# Patient Record
Sex: FEMALE | Race: BLACK OR AFRICAN AMERICAN | NOT HISPANIC OR LATINO
[De-identification: names, ages, dates, MRNs, and addresses within clinical notes are randomized per-mention and may not be internally consistent; named-entity substitution may affect disease eponyms.]

---

## 2017-05-16 ENCOUNTER — APPOINTMENT (OUTPATIENT)
Dept: CARDIOLOGY | Facility: CLINIC | Age: 37
End: 2017-05-16

## 2017-05-16 ENCOUNTER — NON-APPOINTMENT (OUTPATIENT)
Age: 37
End: 2017-05-16

## 2017-05-16 VITALS
OXYGEN SATURATION: 97 % | SYSTOLIC BLOOD PRESSURE: 120 MMHG | HEART RATE: 76 BPM | BODY MASS INDEX: 30.21 KG/M2 | WEIGHT: 160 LBS | HEIGHT: 61 IN | DIASTOLIC BLOOD PRESSURE: 81 MMHG

## 2018-01-30 ENCOUNTER — APPOINTMENT (OUTPATIENT)
Dept: CARDIOLOGY | Facility: CLINIC | Age: 38
End: 2018-01-30

## 2018-02-06 ENCOUNTER — APPOINTMENT (OUTPATIENT)
Dept: CARDIOLOGY | Facility: CLINIC | Age: 38
End: 2018-02-06
Payer: COMMERCIAL

## 2018-02-06 ENCOUNTER — NON-APPOINTMENT (OUTPATIENT)
Age: 38
End: 2018-02-06

## 2018-02-06 VITALS
SYSTOLIC BLOOD PRESSURE: 103 MMHG | HEIGHT: 61 IN | WEIGHT: 160 LBS | DIASTOLIC BLOOD PRESSURE: 73 MMHG | BODY MASS INDEX: 30.21 KG/M2 | OXYGEN SATURATION: 100 % | HEART RATE: 88 BPM

## 2018-02-06 PROCEDURE — 93000 ELECTROCARDIOGRAM COMPLETE: CPT

## 2018-02-06 PROCEDURE — 99215 OFFICE O/P EST HI 40 MIN: CPT

## 2018-04-04 ENCOUNTER — APPOINTMENT (OUTPATIENT)
Dept: PLASTIC SURGERY | Facility: CLINIC | Age: 38
End: 2018-04-04

## 2018-04-04 VITALS
TEMPERATURE: 99.1 F | WEIGHT: 180 LBS | BODY MASS INDEX: 34.01 KG/M2 | SYSTOLIC BLOOD PRESSURE: 116 MMHG | DIASTOLIC BLOOD PRESSURE: 81 MMHG | RESPIRATION RATE: 15 BRPM | HEART RATE: 89 BPM

## 2018-05-21 ENCOUNTER — RESULT REVIEW (OUTPATIENT)
Age: 38
End: 2018-05-21

## 2019-01-15 ENCOUNTER — APPOINTMENT (OUTPATIENT)
Dept: CARDIOLOGY | Facility: CLINIC | Age: 39
End: 2019-01-15
Payer: COMMERCIAL

## 2019-01-15 ENCOUNTER — NON-APPOINTMENT (OUTPATIENT)
Age: 39
End: 2019-01-15

## 2019-01-15 VITALS
DIASTOLIC BLOOD PRESSURE: 66 MMHG | OXYGEN SATURATION: 98 % | HEART RATE: 95 BPM | SYSTOLIC BLOOD PRESSURE: 109 MMHG | HEIGHT: 61 IN

## 2019-01-15 DIAGNOSIS — Z87.898 PERSONAL HISTORY OF OTHER SPECIFIED CONDITIONS: ICD-10-CM

## 2019-01-15 PROCEDURE — 99214 OFFICE O/P EST MOD 30 MIN: CPT

## 2019-01-15 PROCEDURE — 93000 ELECTROCARDIOGRAM COMPLETE: CPT

## 2019-01-15 NOTE — HISTORY OF PRESENT ILLNESS
[FreeTextEntry1] : Ms. Rosario is a 38 year-old woman with known recent CVA who was found to have a PFO during her workup. She has recovered from her CVA fully however given that is was while she was on aspirin therapy given her sickle cell trait she was referred to discuss closure. She has successfully underwent closure of the PFO without incident. She has been feeling no chest pain or dyspnea.

## 2019-01-15 NOTE — DISCUSSION/SUMMARY
[FreeTextEntry1] : Ms. Rosario is a 38 year-old woman with known CVA with a PFO.\par \par Plan:\par 1. Given the hx CVA with recovery and the only finding of a PFO on therapy, it was successfully closed.\par 2. C/W aspirin as of now indefinitely. Check TTE.\par 3. Old records requested and reviewed with performing physician.\par 4. Primary and secondary prevention of cardiovascular and related conditions discussed at length, including but not limited to diet and lifestyle modification.\par 5. Patient to return to the office in 1-3 months.\par \par Thank you for allowing me to participate in the care of your patient. If you have any questions, please feel free to contact me at [phone] or via email at [email or web address].\par \par Sincerely,\par \par Chris Matute MD [facility]\par Email: [email or web address]

## 2019-06-11 PROBLEM — Z00.00 ENCOUNTER FOR PREVENTIVE HEALTH EXAMINATION: Status: ACTIVE | Noted: 2019-06-11

## 2019-07-23 ENCOUNTER — APPOINTMENT (OUTPATIENT)
Dept: PLASTIC SURGERY | Facility: CLINIC | Age: 39
End: 2019-07-23

## 2019-08-23 ENCOUNTER — APPOINTMENT (OUTPATIENT)
Dept: CV DIAGNOSITCS | Facility: HOSPITAL | Age: 39
End: 2019-08-23

## 2019-08-23 ENCOUNTER — OUTPATIENT (OUTPATIENT)
Dept: OUTPATIENT SERVICES | Facility: HOSPITAL | Age: 39
LOS: 1 days | End: 2019-08-23
Payer: COMMERCIAL

## 2019-08-23 DIAGNOSIS — Q21.1 ATRIAL SEPTAL DEFECT: ICD-10-CM

## 2019-08-23 DIAGNOSIS — Z98.89 OTHER SPECIFIED POSTPROCEDURAL STATES: Chronic | ICD-10-CM

## 2019-08-23 PROCEDURE — 93306 TTE W/DOPPLER COMPLETE: CPT

## 2019-08-23 PROCEDURE — 93306 TTE W/DOPPLER COMPLETE: CPT | Mod: 26

## 2019-09-17 ENCOUNTER — APPOINTMENT (OUTPATIENT)
Dept: PLASTIC SURGERY | Facility: CLINIC | Age: 39
End: 2019-09-17

## 2019-11-25 ENCOUNTER — RESULT REVIEW (OUTPATIENT)
Age: 39
End: 2019-11-25

## 2019-12-17 ENCOUNTER — APPOINTMENT (OUTPATIENT)
Dept: PLASTIC SURGERY | Facility: CLINIC | Age: 39
End: 2019-12-17

## 2020-03-10 ENCOUNTER — APPOINTMENT (OUTPATIENT)
Dept: PLASTIC SURGERY | Facility: CLINIC | Age: 40
End: 2020-03-10

## 2020-06-02 ENCOUNTER — APPOINTMENT (OUTPATIENT)
Dept: CARDIOLOGY | Facility: CLINIC | Age: 40
End: 2020-06-02
Payer: COMMERCIAL

## 2020-06-02 VITALS
BODY MASS INDEX: 29.27 KG/M2 | DIASTOLIC BLOOD PRESSURE: 85 MMHG | OXYGEN SATURATION: 99 % | HEIGHT: 61 IN | SYSTOLIC BLOOD PRESSURE: 120 MMHG | WEIGHT: 155 LBS | HEART RATE: 98 BPM

## 2020-06-02 DIAGNOSIS — Z00.00 ENCOUNTER FOR GENERAL ADULT MEDICAL EXAMINATION W/OUT ABNORMAL FINDINGS: ICD-10-CM

## 2020-06-02 PROCEDURE — 93000 ELECTROCARDIOGRAM COMPLETE: CPT

## 2020-06-02 PROCEDURE — 99214 OFFICE O/P EST MOD 30 MIN: CPT

## 2020-06-02 NOTE — PHYSICAL EXAM
[General Appearance - Well Developed] : well developed [Normal Appearance] : normal appearance [Well Groomed] : well groomed [General Appearance - Well Nourished] : well nourished [No Deformities] : no deformities [General Appearance - In No Acute Distress] : no acute distress [Normal Conjunctiva] : the conjunctiva exhibited no abnormalities [Eyelids - No Xanthelasma] : the eyelids demonstrated no xanthelasmas [Normal Oral Mucosa] : normal oral mucosa [No Oral Pallor] : no oral pallor [No Oral Cyanosis] : no oral cyanosis [Normal Jugular Venous A Waves Present] : normal jugular venous A waves present [No Jugular Venous Suggs A Waves] : no jugular venous suggs A waves [Normal Jugular Venous V Waves Present] : normal jugular venous V waves present [Respiration, Rhythm And Depth] : normal respiratory rhythm and effort [Auscultation Breath Sounds / Voice Sounds] : lungs were clear to auscultation bilaterally [Exaggerated Use Of Accessory Muscles For Inspiration] : no accessory muscle use [Heart Rate And Rhythm] : heart rate and rhythm were normal [Heart Sounds] : normal S1 and S2 [Murmurs] : no murmurs present [Abdomen Soft] : soft [Abdomen Tenderness] : non-tender [Abdomen Mass (___ Cm)] : no abdominal mass palpated [Abnormal Walk] : normal gait [Gait - Sufficient For Exercise Testing] : the gait was sufficient for exercise testing [Cyanosis, Localized] : no localized cyanosis [Nail Clubbing] : no clubbing of the fingernails [Petechial Hemorrhages (___cm)] : no petechial hemorrhages [Skin Color & Pigmentation] : normal skin color and pigmentation [] : no rash [No Venous Stasis] : no venous stasis [Skin Lesions] : no skin lesions [No Skin Ulcers] : no skin ulcer [No Xanthoma] : no  xanthoma was observed [Oriented To Time, Place, And Person] : oriented to person, place, and time [Affect] : the affect was normal [Mood] : the mood was normal [No Anxiety] : not feeling anxious

## 2020-06-02 NOTE — HISTORY OF PRESENT ILLNESS
[FreeTextEntry1] : Ms. Rosario is a 40 year-old woman with known recent CVA who was found to have a PFO during her workup. She has recovered from her CVA fully however given that is was while she was on aspirin therapy given her sickle cell trait she was referred to discuss closure. She has successfully underwent closure of the PFO without incident. She has been feeling no chest pain or dyspnea.\par \par Active Issues:\par 1. Actively treated sickle cell\par 2. Actively treated TIA - asa\par 3. Actively treated PFO s/p closure

## 2020-06-02 NOTE — DISCUSSION/SUMMARY
[FreeTextEntry1] : Ms. Rosario is a 40 year-old woman with known CVA with a PFO.\par \par Plan:\par 1. Given the hx CVA with recovery and the only finding of a PFO on therapy, it was successfully closed.\par 2. C/W aspirin as of now indefinitely. Check TTE.\par 3. Old records requested and reviewed with performing physician.\par 4. Primary and secondary prevention of cardiovascular and related conditions discussed at length, including but not limited to diet and lifestyle modification.\par 5. Patient to return to the office in 1-3 months.\par \par Thank you for allowing me to participate in the care of your patient. If you have any questions, please feel free to contact me at [phone] or via email at [email or web address].\par \par Sincerely,\par \par Chris Matute MD [facility]\par Email: [email or web address]

## 2020-07-30 ENCOUNTER — APPOINTMENT (OUTPATIENT)
Dept: PLASTIC SURGERY | Facility: CLINIC | Age: 40
End: 2020-07-30

## 2020-07-31 ENCOUNTER — APPOINTMENT (OUTPATIENT)
Dept: PLASTIC SURGERY | Facility: CLINIC | Age: 40
End: 2020-07-31
Payer: COMMERCIAL

## 2020-07-31 VITALS
BODY MASS INDEX: 30.21 KG/M2 | HEART RATE: 108 BPM | TEMPERATURE: 98.9 F | SYSTOLIC BLOOD PRESSURE: 119 MMHG | WEIGHT: 160 LBS | DIASTOLIC BLOOD PRESSURE: 83 MMHG | OXYGEN SATURATION: 97 % | HEIGHT: 61 IN

## 2020-07-31 DIAGNOSIS — L30.4 ERYTHEMA INTERTRIGO: ICD-10-CM

## 2020-07-31 PROCEDURE — XXXXX: CPT

## 2020-08-13 ENCOUNTER — APPOINTMENT (OUTPATIENT)
Dept: PLASTIC SURGERY | Facility: CLINIC | Age: 40
End: 2020-08-13

## 2020-08-24 ENCOUNTER — APPOINTMENT (OUTPATIENT)
Dept: PLASTIC SURGERY | Facility: CLINIC | Age: 40
End: 2020-08-24

## 2020-08-25 ENCOUNTER — APPOINTMENT (OUTPATIENT)
Dept: CARDIOLOGY | Facility: CLINIC | Age: 40
End: 2020-08-25
Payer: COMMERCIAL

## 2020-08-25 VITALS
WEIGHT: 160 LBS | BODY MASS INDEX: 30.21 KG/M2 | DIASTOLIC BLOOD PRESSURE: 82 MMHG | SYSTOLIC BLOOD PRESSURE: 114 MMHG | OXYGEN SATURATION: 99 % | HEART RATE: 81 BPM | HEIGHT: 61 IN

## 2020-08-25 DIAGNOSIS — D57.3 SICKLE-CELL TRAIT: ICD-10-CM

## 2020-08-25 PROCEDURE — 99213 OFFICE O/P EST LOW 20 MIN: CPT

## 2020-09-17 PROBLEM — L30.4 INTERTRIGO: Status: ACTIVE | Noted: 2020-09-17

## 2020-09-18 NOTE — REASON FOR VISIT
[FreeTextEntry1] : Dr. Harpal Rudd (PCP) [Initial Evaluation] : an initial evaluation [Friend] : friend

## 2020-10-27 ENCOUNTER — APPOINTMENT (OUTPATIENT)
Dept: PLASTIC SURGERY | Facility: CLINIC | Age: 40
End: 2020-10-27
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
## 2020-11-04 NOTE — DISCUSSION/SUMMARY
[FreeTextEntry1] : Ms. Rosario is a 40 year-old woman with known CVA with a PFO.\par \par Plan:\par 1. Given the hx CVA with recovery and the only finding of a PFO on therapy, it was successfully closed.\par 2. C/W aspirin as of now indefinitely. Check TTE.\par 3. Old records requested and reviewed with performing physician.\par 4. Primary and secondary prevention of cardiovascular and related conditions discussed at length, including but not limited to diet and lifestyle modification.\par 5. Patient to return to the office in 1-3 months.\par \par Thank you for allowing me to participate in the care of your patient. If you have any questions, please feel free to contact me at [phone] or via email at [email or web address].\par \par Sincerely,\par \par Chris Matute MD [facility]\par Email: [email or web address]

## 2020-12-03 ENCOUNTER — TRANSCRIPTION ENCOUNTER (OUTPATIENT)
Age: 40
End: 2020-12-03

## 2021-07-13 ENCOUNTER — APPOINTMENT (OUTPATIENT)
Dept: CARDIOLOGY | Facility: CLINIC | Age: 41
End: 2021-07-13

## 2021-07-27 ENCOUNTER — APPOINTMENT (OUTPATIENT)
Dept: CARDIOLOGY | Facility: CLINIC | Age: 41
End: 2021-07-27
Payer: COMMERCIAL

## 2021-07-27 ENCOUNTER — NON-APPOINTMENT (OUTPATIENT)
Age: 41
End: 2021-07-27

## 2021-07-27 VITALS
DIASTOLIC BLOOD PRESSURE: 82 MMHG | HEIGHT: 61 IN | WEIGHT: 170 LBS | OXYGEN SATURATION: 97 % | SYSTOLIC BLOOD PRESSURE: 118 MMHG | BODY MASS INDEX: 32.1 KG/M2 | HEART RATE: 96 BPM

## 2021-07-27 PROCEDURE — 99214 OFFICE O/P EST MOD 30 MIN: CPT

## 2021-07-27 PROCEDURE — 93000 ELECTROCARDIOGRAM COMPLETE: CPT

## 2021-08-17 ENCOUNTER — NON-APPOINTMENT (OUTPATIENT)
Age: 41
End: 2021-08-17

## 2021-08-17 NOTE — HISTORY OF PRESENT ILLNESS
[FreeTextEntry1] : Ms. Rosario is a 41 year-old woman with known recent CVA who was found to have a PFO during her workup. She has recovered from her CVA fully however given that is was while she was on aspirin therapy given her sickle cell trait she was referred to discuss closure. She has successfully underwent closure of the PFO without incident. She has been feeling no chest pain or dyspnea.\par \par Active Issues:\par 1. Actively treated sickle cell\par 2. Actively treated TIA - asa\par 3. Actively treated PFO s/p closure

## 2021-08-17 NOTE — DISCUSSION/SUMMARY
[FreeTextEntry1] : Ms. Rosario is a 41 year-old woman with known CVA with a PFO.\par \par Plan:\par 1. Given the hx CVA with recovery and the only finding of a PFO on therapy, it was successfully closed.\par 2. C/W aspirin as of now indefinitely. Check TTE.\par 3. Old records requested and reviewed with performing physician.\par 4. Primary and secondary prevention of cardiovascular and related conditions discussed at length, including but not limited to diet and lifestyle modification.\par 5. Patient to return to the office in 1-3 months.\par \par Thank you for allowing me to participate in the care of your patient. If you have any questions, please feel free to contact me at [phone] or via email at [email or web address].\par \par Sincerely,\par \par Chris Matute MD [facility]\par Email: [email or web address]

## 2021-08-17 NOTE — PHYSICAL EXAM
[General Appearance - Well Developed] : well developed [Normal Appearance] : normal appearance [Well Groomed] : well groomed [No Deformities] : no deformities [General Appearance - Well Nourished] : well nourished [General Appearance - In No Acute Distress] : no acute distress [Normal Conjunctiva] : the conjunctiva exhibited no abnormalities [Eyelids - No Xanthelasma] : the eyelids demonstrated no xanthelasmas [Normal Oral Mucosa] : normal oral mucosa [No Oral Pallor] : no oral pallor [No Oral Cyanosis] : no oral cyanosis [Normal Jugular Venous A Waves Present] : normal jugular venous A waves present [Normal Jugular Venous V Waves Present] : normal jugular venous V waves present [No Jugular Venous Suggs A Waves] : no jugular venous suggs A waves [Respiration, Rhythm And Depth] : normal respiratory rhythm and effort [Exaggerated Use Of Accessory Muscles For Inspiration] : no accessory muscle use [Auscultation Breath Sounds / Voice Sounds] : lungs were clear to auscultation bilaterally [Heart Rate And Rhythm] : heart rate and rhythm were normal [Heart Sounds] : normal S1 and S2 [Murmurs] : no murmurs present [Abdomen Tenderness] : non-tender [Abdomen Soft] : soft [Abdomen Mass (___ Cm)] : no abdominal mass palpated [Abnormal Walk] : normal gait [Gait - Sufficient For Exercise Testing] : the gait was sufficient for exercise testing [Nail Clubbing] : no clubbing of the fingernails [Cyanosis, Localized] : no localized cyanosis [Petechial Hemorrhages (___cm)] : no petechial hemorrhages [Skin Color & Pigmentation] : normal skin color and pigmentation [] : no rash [No Venous Stasis] : no venous stasis [Skin Lesions] : no skin lesions [No Skin Ulcers] : no skin ulcer [No Xanthoma] : no  xanthoma was observed [Oriented To Time, Place, And Person] : oriented to person, place, and time [Affect] : the affect was normal [Mood] : the mood was normal [No Anxiety] : not feeling anxious

## 2021-08-24 ENCOUNTER — APPOINTMENT (OUTPATIENT)
Dept: CV DIAGNOSITCS | Facility: HOSPITAL | Age: 41
End: 2021-08-24

## 2021-09-17 ENCOUNTER — APPOINTMENT (OUTPATIENT)
Dept: CARDIOLOGY | Facility: CLINIC | Age: 41
End: 2021-09-17

## 2021-09-21 NOTE — HISTORY OF PRESENT ILLNESS
[FreeTextEntry1] : Ms. Rosario is a 40 year-old woman with known recent CVA who was found to have a PFO during her workup. She has recovered from her CVA fully however given that is was while she was on aspirin therapy given her sickle cell trait she was referred to discuss closure. She has successfully underwent closure of the PFO without incident. She has been feeling no chest pain or dyspnea.\par \par Active Issues:\par 1. Actively treated sickle cell\par 2. Actively treated TIA - asa\par 3. Actively treated PFO s/p closure
Family informed

## 2021-10-29 ENCOUNTER — APPOINTMENT (OUTPATIENT)
Dept: CARDIOLOGY | Facility: CLINIC | Age: 41
End: 2021-10-29

## 2022-01-28 ENCOUNTER — APPOINTMENT (OUTPATIENT)
Dept: CARDIOLOGY | Facility: CLINIC | Age: 42
End: 2022-01-28
Payer: COMMERCIAL

## 2022-01-28 PROCEDURE — 93306 TTE W/DOPPLER COMPLETE: CPT

## 2022-02-01 ENCOUNTER — APPOINTMENT (OUTPATIENT)
Dept: CARDIOLOGY | Facility: CLINIC | Age: 42
End: 2022-02-01
Payer: COMMERCIAL

## 2022-02-01 VITALS
OXYGEN SATURATION: 100 % | HEART RATE: 95 BPM | WEIGHT: 175 LBS | DIASTOLIC BLOOD PRESSURE: 80 MMHG | SYSTOLIC BLOOD PRESSURE: 120 MMHG | BODY MASS INDEX: 33.04 KG/M2 | HEIGHT: 61 IN

## 2022-02-01 PROCEDURE — 99214 OFFICE O/P EST MOD 30 MIN: CPT

## 2022-02-01 PROCEDURE — 93000 ELECTROCARDIOGRAM COMPLETE: CPT

## 2022-02-01 NOTE — DISCUSSION/SUMMARY
[FreeTextEntry1] : Ms. Rosario is a 41 year-old woman with known CVA with a PFO.\par \par Plan:\par 1. Given the hx CVA with recovery and the only finding of a PFO on therapy, it was successfully closed.\par 2. C/W aspirin as of now indefinitely. Check TTE.\par 3. Old records requested and reviewed with performing physician.\par 4. Primary and secondary prevention of cardiovascular and related conditions discussed at length, including but not limited to diet and lifestyle modification.\par 5. Patient to return to the office in 1-3 months.\par \par Thank you for allowing me to participate in the care of your patient. If you have any questions, please feel free to contact me at [phone] or via email at [email or web address].\par \par Sincerely,\par \par Chris Matute MD [facility]\par Email: [email or web address]

## 2022-02-07 ENCOUNTER — APPOINTMENT (OUTPATIENT)
Dept: ELECTROPHYSIOLOGY | Facility: CLINIC | Age: 42
End: 2022-02-07
Payer: COMMERCIAL

## 2022-02-07 PROCEDURE — 95800 SLP STDY UNATTENDED: CPT

## 2022-08-23 ENCOUNTER — NON-APPOINTMENT (OUTPATIENT)
Age: 42
End: 2022-08-23

## 2022-08-23 ENCOUNTER — APPOINTMENT (OUTPATIENT)
Dept: CARDIOLOGY | Facility: CLINIC | Age: 42
End: 2022-08-23

## 2022-08-23 VITALS
WEIGHT: 180 LBS | SYSTOLIC BLOOD PRESSURE: 102 MMHG | OXYGEN SATURATION: 97 % | HEART RATE: 88 BPM | HEIGHT: 61 IN | BODY MASS INDEX: 33.99 KG/M2 | DIASTOLIC BLOOD PRESSURE: 69 MMHG

## 2022-08-23 PROCEDURE — 93000 ELECTROCARDIOGRAM COMPLETE: CPT

## 2022-08-23 PROCEDURE — 99214 OFFICE O/P EST MOD 30 MIN: CPT | Mod: 25

## 2022-08-27 NOTE — DISCUSSION/SUMMARY
[FreeTextEntry1] : Ms. Rosario is a 42 year-old woman with known CVA with a PFO.\par \par Plan:\par 1. Given the hx CVA with recovery and the only finding of a PFO on therapy, it was successfully closed.\par 2. C/W aspirin as of now indefinitely. Check TTE.\par 3. Old records requested and reviewed with performing physician.\par 4. Primary and secondary prevention of cardiovascular and related conditions discussed at length, including but not limited to diet and lifestyle modification.\par 5. Patient to return to the office in 1-3 months.\par \par Thank you for allowing me to participate in the care of your patient. If you have any questions, please feel free to contact me at [phone] or via email at [email or web address].\par \par Sincerely,\par \par Chris Matute MD [facility]\par Email: [email or web address] [EKG obtained to assist in diagnosis and management of assessed problem(s)] : EKG obtained to assist in diagnosis and management of assessed problem(s)

## 2022-12-01 ENCOUNTER — APPOINTMENT (OUTPATIENT)
Dept: PLASTIC SURGERY | Facility: CLINIC | Age: 42
End: 2022-12-01

## 2022-12-01 VITALS
BODY MASS INDEX: 34.37 KG/M2 | HEIGHT: 62 IN | WEIGHT: 186.8 LBS | TEMPERATURE: 97.7 F | SYSTOLIC BLOOD PRESSURE: 119 MMHG | DIASTOLIC BLOOD PRESSURE: 79 MMHG

## 2022-12-01 DIAGNOSIS — N62 HYPERTROPHY OF BREAST: ICD-10-CM

## 2022-12-01 DIAGNOSIS — N64.81 PTOSIS OF BREAST: ICD-10-CM

## 2022-12-01 DIAGNOSIS — E88.1 LIPODYSTROPHY, NOT ELSEWHERE CLASSIFIED: ICD-10-CM

## 2022-12-01 PROCEDURE — 99212 OFFICE O/P EST SF 10 MIN: CPT

## 2022-12-14 ENCOUNTER — APPOINTMENT (OUTPATIENT)
Dept: PLASTIC SURGERY | Facility: CLINIC | Age: 42
End: 2022-12-14

## 2022-12-14 VITALS
HEIGHT: 62 IN | WEIGHT: 190 LBS | SYSTOLIC BLOOD PRESSURE: 129 MMHG | DIASTOLIC BLOOD PRESSURE: 79 MMHG | BODY MASS INDEX: 34.96 KG/M2 | HEART RATE: 96 BPM | TEMPERATURE: 97.6 F

## 2022-12-14 PROCEDURE — 99214 OFFICE O/P EST MOD 30 MIN: CPT

## 2022-12-15 NOTE — HISTORY OF PRESENT ILLNESS
[FreeTextEntry1] : 42-year-old woman presents for consultation for potential revision of breast lift.  The patient states the procedure was originally performed in [place] in 2020.  She has concerns about the appearance of the areolas, as well as scarring within the breasts.  She denies any recent lumps, bumps, or other changes in the breasts.  Her last breast imaging was 3 months ago.  She has a history of breast cancer in a relative in her 70s.  She denies any personal or family history of blood clots.  She states that nipple sensation is extremely important to her (5 out of 5 importance).\par \par She denies nicotine use, drinks some alcohol, and denies recreational drug use.  She works as a .  She lives with a partner and states that they would be able to assist her after surgery.

## 2022-12-15 NOTE — ASSESSMENT
[FreeTextEntry1] : The patient has a reasonable result, with good symmetry, and appropriate ptosis.  We reviewed that I am unable to offer her any procedures that I believe would significantly improve her result.  Follow-up as needed.

## 2022-12-15 NOTE — PHYSICAL EXAM
[de-identified] : NAD.  BMI 34.8 [de-identified] : Normal respiratory effort [de-identified] : Breast exam was performed with a medical assistant chaperone present. No dominant masses, skin changes, nipple retraction, or palpable axillary lymphadenopathy. SN->N distance is 28 cm on the right and 28 cm on the left; width is 17.5 cm on the right and 18 cm on the left; N->IMF is 11 cm on the right and 11 cm on the left. There is bilateral grade 1 ptosis.  There is hyperpigmented scarring around the areolas bilaterally, slightly worse on the left.

## 2022-12-23 PROBLEM — N64.81 BREAST PTOSIS: Status: ACTIVE | Noted: 2022-12-15

## 2022-12-23 PROBLEM — N62 MACROMASTIA: Status: ACTIVE | Noted: 2020-09-17

## 2022-12-23 PROBLEM — E88.1 LIPODYSTROPHY: Status: ACTIVE | Noted: 2020-09-17

## 2023-01-30 ENCOUNTER — APPOINTMENT (OUTPATIENT)
Dept: CARDIOLOGY | Facility: CLINIC | Age: 43
End: 2023-01-30

## 2023-02-08 ENCOUNTER — APPOINTMENT (OUTPATIENT)
Dept: CARDIOLOGY | Facility: CLINIC | Age: 43
End: 2023-02-08
Payer: COMMERCIAL

## 2023-02-08 VITALS
BODY MASS INDEX: 32.2 KG/M2 | DIASTOLIC BLOOD PRESSURE: 84 MMHG | HEART RATE: 86 BPM | HEIGHT: 62 IN | OXYGEN SATURATION: 100 % | SYSTOLIC BLOOD PRESSURE: 135 MMHG | WEIGHT: 175 LBS

## 2023-02-08 PROCEDURE — 99214 OFFICE O/P EST MOD 30 MIN: CPT | Mod: 25

## 2023-02-08 PROCEDURE — 93000 ELECTROCARDIOGRAM COMPLETE: CPT

## 2023-02-16 ENCOUNTER — APPOINTMENT (OUTPATIENT)
Dept: PLASTIC SURGERY | Facility: CLINIC | Age: 43
End: 2023-02-16

## 2023-03-02 ENCOUNTER — APPOINTMENT (OUTPATIENT)
Dept: ORTHOPEDIC SURGERY | Facility: CLINIC | Age: 43
End: 2023-03-02

## 2023-03-06 NOTE — DISCUSSION/SUMMARY
[FreeTextEntry1] : 1. C/w aspirin indefinitely.\par 2. TTE ordered for surveillance.\par 3. Old records requested and reviewed with performing physician.\par 4. Primary and secondary prevention of cardiovascular and related conditions discussed at length, including but not limited to diet and lifestyle modification.\par 5. Follow up in 6 months. [EKG obtained to assist in diagnosis and management of assessed problem(s)] : EKG obtained to assist in diagnosis and management of assessed problem(s)

## 2023-03-06 NOTE — HISTORY OF PRESENT ILLNESS
[FreeTextEntry1] : Ms. Rosario is a 41 year-old woman with known recent CVA who was found to have a PFO during her workup. She has recovered from her CVA fully however given that is was while she was on aspirin therapy given her sickle cell trait she was referred to discuss closure. She has successfully underwent closure of the PFO without incident. She has been feeling no chest pain or dyspnea.\par \par Follow-up 2/8/23 - stable from cardiac standpoint. No chest pain or dyspnea. Continues to exercise regularly with no issues. \par \par Active Issues:\par 1. Actively treated sickle cell\par 2. Actively treated TIA - asa\par 3. Actively treated PFO s/p closure \par

## 2023-03-07 ENCOUNTER — APPOINTMENT (OUTPATIENT)
Dept: ORTHOPEDIC SURGERY | Facility: CLINIC | Age: 43
End: 2023-03-07
Payer: OTHER MISCELLANEOUS

## 2023-03-07 ENCOUNTER — APPOINTMENT (OUTPATIENT)
Dept: ORTHOPEDIC SURGERY | Facility: CLINIC | Age: 43
End: 2023-03-07

## 2023-03-07 VITALS — BODY MASS INDEX: 32.2 KG/M2 | HEIGHT: 62 IN | WEIGHT: 175 LBS

## 2023-03-07 DIAGNOSIS — M75.121 COMPLETE ROTATOR CUFF TEAR OR RUPTURE OF RIGHT SHOULDER, NOT SPECIFIED AS TRAUMATIC: ICD-10-CM

## 2023-03-07 PROCEDURE — 73030 X-RAY EXAM OF SHOULDER: CPT | Mod: RT

## 2023-03-07 PROCEDURE — 99072 ADDL SUPL MATRL&STAF TM PHE: CPT

## 2023-03-07 PROCEDURE — 99204 OFFICE O/P NEW MOD 45 MIN: CPT

## 2023-03-09 NOTE — DISCUSSION/SUMMARY
[de-identified] : General Dx Discussion\par The patient was advised of the diagnosis. The natural history of the pathology was explained in full to the patient in layman's terms. All questions were answered. The risks and benefits of surgical and non-surgical treatment alternatives were explained in full to the patient.\par \par Case Discussed. Will get a mri rt shoulder to assess the extent of RCT. \par Questions answered.\par \par \par Entered by Anayeli ACUNA acting as a scribe.\par Instructions: Dr. Short- The documentation recorded by the scribe accurately reflects the service I personally performed and the decisions made by me.\par

## 2023-03-09 NOTE — DISCUSSION/SUMMARY
[de-identified] : General Dx Discussion\par The patient was advised of the diagnosis. The natural history of the pathology was explained in full to the patient in layman's terms. All questions were answered. The risks and benefits of surgical and non-surgical treatment alternatives were explained in full to the patient.\par \par Case Discussed. Will get a mri rt shoulder to assess the extent of RCT. \par Questions answered.\par \par \par Entered by Anayeli ACUNA acting as a scribe.\par Instructions: Dr. Short- The documentation recorded by the scribe accurately reflects the service I personally performed and the decisions made by me.\par

## 2023-03-09 NOTE — REVIEW OF SYSTEMS
[Joint Pain] : joint pain [Joint Stiffness] : joint stiffness [FreeTextEntry9] : rt shoulder, rt arm, rt wrist

## 2023-03-09 NOTE — PHYSICAL EXAM
[Right] : right shoulder [] : positive drop-arm test [TWNoteComboBox7] : active forward flexion 45 degrees [de-identified] : active abduction 40 degrees
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
## 2023-03-09 NOTE — DISCUSSION/SUMMARY
[de-identified] : General Dx Discussion\par The patient was advised of the diagnosis. The natural history of the pathology was explained in full to the patient in layman's terms. All questions were answered. The risks and benefits of surgical and non-surgical treatment alternatives were explained in full to the patient.\par \par Case Discussed. Will get a mri rt shoulder to assess the extent of RCT. \par Questions answered.\par \par \par Entered by Anayeli ACUNA acting as a scribe.\par Instructions: Dr. Short- The documentation recorded by the scribe accurately reflects the service I personally performed and the decisions made by me.\par
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
## 2023-03-09 NOTE — PHYSICAL EXAM
[Right] : right shoulder [] : positive drop-arm test [TWNoteComboBox7] : active forward flexion 45 degrees [de-identified] : active abduction 40 degrees

## 2023-03-09 NOTE — PHYSICAL EXAM
No pertinent family history in first degree relatives [Right] : right shoulder [] : no erythema [TWNoteComboBox7] : active forward flexion 45 degrees [de-identified] : active abduction 40 degrees

## 2023-03-09 NOTE — DATA REVIEWED
[MRI] : MRI [Right] : of the right [Shoulder] : shoulder [Report was reviewed and noted in the chart] : The report was reviewed and noted in the chart [FreeTextEntry1] : 5/17/21 [facility]    Full-thickness tear in the anterior supraspinatus tendon. Fluid extends into the\par subacromial and subdeltoid bursa related to full thickness rotator cuff tearing.\par \par Mild AC joint arthrosis.\par \par Small glenohumeral joint effusion.\par

## 2023-03-27 ENCOUNTER — APPOINTMENT (OUTPATIENT)
Dept: ORTHOPEDIC SURGERY | Facility: CLINIC | Age: 43
End: 2023-03-27
Payer: OTHER MISCELLANEOUS

## 2023-03-27 VITALS — HEIGHT: 62 IN | BODY MASS INDEX: 32.2 KG/M2 | WEIGHT: 175 LBS

## 2023-03-27 PROCEDURE — 99214 OFFICE O/P EST MOD 30 MIN: CPT

## 2023-03-27 NOTE — PHYSICAL EXAM
[Right] : right shoulder [5 ___] : forward flexion 5[unfilled]/5 [4___] : external rotation 4[unfilled]/5 [5___] : internal rotation 5[unfilled]/5 [] : no erythema [TWNoteComboBox7] : active forward flexion 45 degrees [TWNoteComboBox4] : passive forward flexion 150 degrees [de-identified] : active abduction 40 degrees [TWNoteComboBox9] : passive abduction 130 degrees [TWNoteComboBox6] : internal rotation 40 degrees [de-identified] : external rotation 45 degrees

## 2023-03-27 NOTE — DISCUSSION/SUMMARY
[de-identified] : General Dx Discussion\par The patient was advised of the diagnosis. The natural history of the pathology was explained in full to the patient in layman's terms. All questions were answered. The risks and benefits of surgical and non-surgical treatment alternatives were explained in full to the patient.\par \par Case Discussed.\par MRI reviewed, has with pain and loss of motion has a full thickness RCT \par Arthroscopy with decompression, debridement RCR discussed\par Risks, benefits and alternatives discussed. Risks include, but are not limited to infection, blood clot, nerve damage, recurrent tear, loss of motion, continued pain, worsened pain, need for another surgery in the future. Recurrent tear discussed \par Discussed that the surgery will not address any pain that’ she has from any OA she may have. She understands she will not be 100% better after surgery. Prolonged immobilization and rehabilitation discussed. Work limitations discussed.\par Questions answered.\par She expressed understanding and would like to proceed.\par \par The patient was advised of the diagnosis.  The natural history of the pathology was explained in full to the patient in layman's terms. All questions were answered.  The risks and benefits of surgical and non-surgical treatment alternatives were explained in full to the patient.  \par The patient demonstrated a full understanding of the surgical and non-surgical options.  The risks of surgery were outlined in full to the patient including but not limited to bleeding, scarring, infection, sepsis, neurologic injury, vascular injury, failure to resolve symptoms, symptom recurrence, the need for further surgery, non-healing, wound breakdown, deep vein thrombosis, pulmonary embolism, spontaneous osteonecrosis, anesthesia complications and even death.  The patient understood all the risks and accepted them and understood that other complications could occur that are not mentioned above.  The intraoperative plan, post-operative plan, post-operative expectations and limitations were explained in full.  Expectations from non-surgical treatment were explained in full as well.  The patient demonstrated a complete understanding of the treatment alternatives and requested the above-mentioned procedure.  This will be scheduled accordingly.\par

## 2023-03-27 NOTE — WORK
[Partial] : partial [Does not reveal pre-existing condition(s) that may affect treatment/prognosis] : does not reveal pre-existing condition(s) that may affect treatment/prognosis [15+ days] : 15+ days [Patient] : patient [No Rx restrictions] : No Rx restrictions. [I provided the services listed above] :  I provided the services listed above. [FreeTextEntry2] : working light duty

## 2023-03-27 NOTE — HISTORY OF PRESENT ILLNESS
[Work related] : work related [8] : 8 [7] : 7 [Burning] : burning [Sharp] : sharp [Shooting] : shooting [Throbbing] : throbbing [Tingling] : tingling [Intermittent] : intermittent [Household chores] : household chores [Leisure] : leisure [Work] : work [Sleep] : sleep [Light duty] : Work status: light duty [] : yes [de-identified] : Patient is here for MRI results of her right shoulder. [FreeTextEntry1] : Right shoulder [FreeTextEntry3] : 4/15/19 [FreeTextEntry7] : down to her wrist [FreeTextEntry9] : Ibuprofen [de-identified] : certain movements [de-identified] : none

## 2023-03-27 NOTE — DATA REVIEWED
[MRI] : MRI [Right] : of the right [Shoulder] : shoulder [Report was reviewed and noted in the chart] : The report was reviewed and noted in the chart [I reviewed the films/CD] : I reviewed the films/CD [FreeTextEntry1] : FT RCT

## 2023-04-18 ENCOUNTER — APPOINTMENT (OUTPATIENT)
Dept: ORTHOPEDIC SURGERY | Facility: CLINIC | Age: 43
End: 2023-04-18

## 2023-04-24 ENCOUNTER — APPOINTMENT (OUTPATIENT)
Dept: ORTHOPEDIC SURGERY | Facility: CLINIC | Age: 43
End: 2023-04-24

## 2023-04-24 NOTE — DATA REVIEWED
[MRI] : MRI [Right] : of the right [Shoulder] : shoulder [Report was reviewed and noted in the chart] : The report was reviewed and noted in the chart [FreeTextEntry1] : 5/17/21 [facility]    Full-thickness tear in the anterior supraspinatus tendon. Fluid extends into the\par subacromial and subdeltoid bursa related to full thickness rotator cuff tearing.\par \par Mild AC joint arthrosis.\par \par Small glenohumeral joint effusion.\par  foot swelling

## 2023-05-01 ENCOUNTER — APPOINTMENT (OUTPATIENT)
Dept: ORTHOPEDIC SURGERY | Facility: CLINIC | Age: 43
End: 2023-05-01
Payer: OTHER MISCELLANEOUS

## 2023-05-01 VITALS — WEIGHT: 175 LBS | HEIGHT: 62 IN | BODY MASS INDEX: 32.2 KG/M2

## 2023-05-01 PROCEDURE — 99214 OFFICE O/P EST MOD 30 MIN: CPT

## 2023-05-01 NOTE — DISCUSSION/SUMMARY
[de-identified] : General Dx discussion\par The patient was advised of the diagnosis. The natural history of the pathology was explained in full to the patient in layman's terms. All questions were answered. The risks and benefits of surgical and non-surgical treatment alternatives were explained in full to the patient. \par \par Case discussed.\par Will begin PT.\par Follow up in 1 month, sooner if surgery is approved. \par \par Entered by KALPANA Moon acting as scribe.\par -\par The documentation recorded by the scribe accurately reflects the service I personally performed and the decisions made by me.

## 2023-05-01 NOTE — PHYSICAL EXAM
[Right] : right shoulder [5 ___] : forward flexion 5[unfilled]/5 [4___] : external rotation 4[unfilled]/5 [5___] : internal rotation 5[unfilled]/5 [] : no erythema [TWNoteComboBox7] : active forward flexion 80 degrees [TWNoteComboBox4] : passive forward flexion 150 degrees [de-identified] : active abduction 40 degrees [TWNoteComboBox9] : passive abduction 130 degrees [TWNoteComboBox6] : internal rotation 40 degrees [de-identified] : external rotation 45 degrees

## 2023-05-01 NOTE — WORK
[Partial] : partial [Does not reveal pre-existing condition(s) that may affect treatment/prognosis] : does not reveal pre-existing condition(s) that may affect treatment/prognosis [Cannot return to work because ________] : cannot return to work because [unfilled] [Patient] : patient [No Rx restrictions] : No Rx restrictions. [I provided the services listed above] :  I provided the services listed above.

## 2023-05-01 NOTE — REASON FOR VISIT
[FreeTextEntry2] : WC follow up-right shoulder\par no improvement in pain since last visit. Starting PT today.

## 2023-05-01 NOTE — HISTORY OF PRESENT ILLNESS
[Work related] : work related [8] : 8 [7] : 7 [Burning] : burning [Sharp] : sharp [Shooting] : shooting [Throbbing] : throbbing [Tingling] : tingling [Intermittent] : intermittent [Household chores] : household chores [Leisure] : leisure [Work] : work [Sleep] : sleep [Meds] : meds [Ice] : ice [Massage] : massage [Light duty] : Work status: light duty [] : yes [de-identified] : Patient is here for a  WC follow up on her right shoulder, DOI 4/15/19. [FreeTextEntry1] : Right shoulder [FreeTextEntry3] : 4/15/19 [FreeTextEntry7] : down to her wrist [FreeTextEntry9] : Ibuprofen [de-identified] : certain movements [de-identified] : none

## 2023-05-05 ENCOUNTER — APPOINTMENT (OUTPATIENT)
Dept: ORTHOPEDIC SURGERY | Facility: CLINIC | Age: 43
End: 2023-05-05
Payer: OTHER MISCELLANEOUS

## 2023-05-05 VITALS — BODY MASS INDEX: 32.2 KG/M2 | HEIGHT: 62 IN | WEIGHT: 175 LBS

## 2023-05-05 PROCEDURE — 73562 X-RAY EXAM OF KNEE 3: CPT | Mod: LT

## 2023-05-05 PROCEDURE — 99213 OFFICE O/P EST LOW 20 MIN: CPT

## 2023-05-05 NOTE — WORK
[Sprain/Strain] : sprain/strain [Was the competent medical cause of the injury] : was the competent medical cause of the injury [Are consistent with the injury] : are consistent with the injury [Consistent with my objective findings] : consistent with my objective findings [Mild Partial] : mild partial [Does not reveal pre-existing condition(s) that may affect treatment/prognosis] : does not reveal pre-existing condition(s) that may affect treatment/prognosis [Can return to work without limitations on ______] : can return to work without limitations on [unfilled] [N/A] : : Not Applicable [Patient] : patient [No] : No [No Rx restrictions] : No Rx restrictions. [I provided the services listed above] :  I provided the services listed above. [FreeTextEntry1] : fair

## 2023-05-05 NOTE — PHYSICAL EXAM
[NL (0)] : extension 0 degrees [5___] : hamstring 5[unfilled]/5 [] : negative Lachmann [Equivocal] : equivocal Oliva [Left] : left knee [AP] : anteroposterior [Lateral] : lateral [There are no fractures, subluxations or dislocations. No significant abnormalities are seen] : There are no fractures, subluxations or dislocations. No significant abnormalities are seen [TWNoteComboBox7] : flexion 130 degrees
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
## 2023-06-09 ENCOUNTER — APPOINTMENT (OUTPATIENT)
Dept: ORTHOPEDIC SURGERY | Facility: CLINIC | Age: 43
End: 2023-06-09
Payer: OTHER MISCELLANEOUS

## 2023-06-09 VITALS — HEIGHT: 62 IN | BODY MASS INDEX: 32.2 KG/M2 | WEIGHT: 175 LBS

## 2023-06-09 PROCEDURE — 99213 OFFICE O/P EST LOW 20 MIN: CPT

## 2023-06-09 NOTE — PHYSICAL EXAM
[NL (0)] : extension 0 degrees [5___] : hamstring 5[unfilled]/5 [Equivocal] : equivocal Oliva [Left] : left knee [AP] : anteroposterior [Lateral] : lateral [There are no fractures, subluxations or dislocations. No significant abnormalities are seen] : There are no fractures, subluxations or dislocations. No significant abnormalities are seen [] : negative Lachmann [TWNoteComboBox7] : flexion 130 degrees
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
## 2023-06-19 ENCOUNTER — APPOINTMENT (OUTPATIENT)
Dept: ORTHOPEDIC SURGERY | Facility: CLINIC | Age: 43
End: 2023-06-19
Payer: OTHER MISCELLANEOUS

## 2023-06-19 VITALS — BODY MASS INDEX: 30.75 KG/M2 | WEIGHT: 165 LBS | HEIGHT: 61.5 IN

## 2023-06-19 PROCEDURE — 99214 OFFICE O/P EST MOD 30 MIN: CPT

## 2023-06-20 NOTE — DISCUSSION/SUMMARY
[Surgical risks reviewed] : Surgical risks reviewed [de-identified] : General Dx discussion\par The patient was advised of the diagnosis. The natural history of the pathology was explained in full to the patient in layman's terms. All questions were answered. The risks and benefits of surgical and non-surgical treatment alternatives were explained in full to the patient. \par \par case discussed she is going to schedule surgery \par she understands / questions answered \par will cont PT and HEP \par \par Case Discussed.\par MRI reviewed, has with pain and loss of motion \par Arthroscopy with decompression, debridement RCR discussed\par Risks, benefits and alternatives discussed. Risks include, but are not limited to infection, blood clot, nerve damage, recurrent tear, loss of motion, continued pain, worsened pain, need for another surgery in the future. Recurrent tear discussed \par Discussed that the surgery will not address any pain that she has from any OA she may have. She understands she will not be 100% better after surgery. Prolonged immobilization and rehabilitation discussed. \par Questions answered.\par She expressed understanding and would like to proceed.\par \par \par \par

## 2023-06-20 NOTE — HISTORY OF PRESENT ILLNESS
[Work related] : work related [8] : 8 [7] : 7 [Burning] : burning [Sharp] : sharp [Shooting] : shooting [Throbbing] : throbbing [Constant] : constant [Household chores] : household chores [Leisure] : leisure [Work] : work [Sleep] : sleep [Meds] : meds [Massage] : massage [Retired] : Work status: retired [] : yes [de-identified] : Patient is here for a \par WC follow up on her right shoulder, DOI 4/15/19. [FreeTextEntry1] : Right shoulder [FreeTextEntry3] : 4/15/19 [FreeTextEntry7] : down to her hand [de-identified] : lifting [de-identified] : Stretching

## 2023-06-20 NOTE — WORK
[Does not reveal pre-existing condition(s) that may affect treatment/prognosis] : does not reveal pre-existing condition(s) that may affect treatment/prognosis [Patient] : patient [No Rx restrictions] : No Rx restrictions. [I provided the services listed above] :  I provided the services listed above. [N/A] : : Not Applicable [FreeTextEntry2] : pt has retired

## 2023-06-20 NOTE — PHYSICAL EXAM
[Right] : right shoulder [5 ___] : forward flexion 5[unfilled]/5 [4___] : external rotation 4[unfilled]/5 [5___] : internal rotation 5[unfilled]/5 [] : no erythema [TWNoteComboBox7] : active forward flexion 70 degrees [TWNoteComboBox4] : passive forward flexion 150 degrees [de-identified] : active abduction 40 degrees [TWNoteComboBox9] : passive abduction 130 degrees [TWNoteComboBox6] : internal rotation 40 degrees [de-identified] : external rotation 45 degrees

## 2023-08-04 ENCOUNTER — APPOINTMENT (OUTPATIENT)
Dept: ORTHOPEDIC SURGERY | Facility: CLINIC | Age: 43
End: 2023-08-04
Payer: OTHER MISCELLANEOUS

## 2023-08-04 VITALS — HEIGHT: 61 IN | WEIGHT: 165 LBS | BODY MASS INDEX: 31.15 KG/M2

## 2023-08-04 PROCEDURE — 99213 OFFICE O/P EST LOW 20 MIN: CPT

## 2023-08-04 NOTE — REASON FOR VISIT
[FreeTextEntry2] : 8/4/23- Had MRI: patellofemoral arthritis, no meniscal tears 6/9/23- Still sharp left knee pain
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
## 2023-08-07 ENCOUNTER — APPOINTMENT (OUTPATIENT)
Dept: ORTHOPEDIC SURGERY | Facility: CLINIC | Age: 43
End: 2023-08-07
Payer: OTHER MISCELLANEOUS

## 2023-08-07 VITALS — HEIGHT: 61 IN | BODY MASS INDEX: 31.15 KG/M2 | WEIGHT: 165 LBS

## 2023-08-07 PROCEDURE — 99214 OFFICE O/P EST MOD 30 MIN: CPT

## 2023-08-07 NOTE — WORK
[Does not reveal pre-existing condition(s) that may affect treatment/prognosis] : does not reveal pre-existing condition(s) that may affect treatment/prognosis [N/A] : : Not Applicable [Patient] : patient [No Rx restrictions] : No Rx restrictions. [I provided the services listed above] :  I provided the services listed above. [FreeTextEntry2] : pt has retired

## 2023-08-07 NOTE — HISTORY OF PRESENT ILLNESS
[Work related] : work related [9] : 9 [8] : 8 [Radiating] : radiating [Tingling] : tingling [Frequent] : frequent [Household chores] : household chores [Leisure] : leisure [Sleep] : sleep [Meds] : meds [Not working due to injury] : Work status: not working due to injury [] : yes [de-identified] : Patient is here for a WC follow up on her right shoulder. Surgery is scheduled for 8/30/23.  [FreeTextEntry1] : Right shoulder [FreeTextEntry3] : 4/15/19 [FreeTextEntry7] : to her hand [FreeTextEntry9] : ibuprofen [de-identified] : carrying things [de-identified] : none

## 2023-08-07 NOTE — PHYSICAL EXAM
[Right] : right shoulder [5 ___] : forward flexion 5[unfilled]/5 [4___] : external rotation 4[unfilled]/5 [5___] : internal rotation 5[unfilled]/5 [] : positive drop-arm test [TWNoteComboBox7] : active forward flexion 70 degrees [TWNoteComboBox4] : passive forward flexion 155 degrees [de-identified] : active abduction 40 degrees [TWNoteComboBox9] : passive abduction 135 degrees [TWNoteComboBox6] : internal rotation 40 degrees [de-identified] : external rotation 50 degrees

## 2023-08-07 NOTE — DISCUSSION/SUMMARY
[Surgical risks reviewed] : Surgical risks reviewed [de-identified] : General Dx discussion The patient was advised of the diagnosis. The natural history of the pathology was explained in full to the patient in layman's terms. All questions were answered. The risks and benefits of surgical and non-surgical treatment alternatives were explained in full to the patient.   Case Discussed. MRI reviewed, has with pain and loss of motion.  Arthroscopy with decompression, debridement RCR is scheduled.  Risks, benefits and alternatives discussed. Risks include, but are not limited to infection, blood clot, nerve damage, recurrent tear, loss of motion, continued pain, worsened pain, need for another surgery in the future. Recurrent tear discussed  Discussed that the surgery will not address any pain that she has from any OA she may have. She understands she will not be 100% better after surgery. Prolonged immobilization and rehabilitation discussed.  Questions answered. She expressed understanding and would like to proceed.

## 2023-08-10 ENCOUNTER — APPOINTMENT (OUTPATIENT)
Dept: ORTHOPEDIC SURGERY | Facility: CLINIC | Age: 43
End: 2023-08-10

## 2023-08-30 ENCOUNTER — APPOINTMENT (OUTPATIENT)
Dept: CARDIOLOGY | Facility: CLINIC | Age: 43
End: 2023-08-30
Payer: COMMERCIAL

## 2023-08-30 ENCOUNTER — APPOINTMENT (OUTPATIENT)
Age: 43
End: 2023-08-30

## 2023-08-30 VITALS
HEART RATE: 89 BPM | OXYGEN SATURATION: 98 % | SYSTOLIC BLOOD PRESSURE: 111 MMHG | BODY MASS INDEX: 33.04 KG/M2 | HEIGHT: 61 IN | DIASTOLIC BLOOD PRESSURE: 77 MMHG | WEIGHT: 175 LBS

## 2023-08-30 DIAGNOSIS — I63.9 CEREBRAL INFARCTION, UNSPECIFIED: ICD-10-CM

## 2023-08-30 DIAGNOSIS — Q21.1 ATRIAL SEPTAL DEFECT: ICD-10-CM

## 2023-08-30 PROCEDURE — 93000 ELECTROCARDIOGRAM COMPLETE: CPT

## 2023-08-30 PROCEDURE — 99214 OFFICE O/P EST MOD 30 MIN: CPT | Mod: 25

## 2023-08-30 NOTE — DISCUSSION/SUMMARY
[EKG obtained to assist in diagnosis and management of assessed problem(s)] : EKG obtained to assist in diagnosis and management of assessed problem(s) [FreeTextEntry1] : 1. C/w aspirin indefinitely.\par  2. TTE ordered for surveillance.\par  3. Old records requested and reviewed with performing physician.\par  4. Primary and secondary prevention of cardiovascular and related conditions discussed at length, including but not limited to diet and lifestyle modification.\par  5. Follow up in 6 months.

## 2023-08-31 ENCOUNTER — APPOINTMENT (OUTPATIENT)
Dept: ORTHOPEDIC SURGERY | Facility: CLINIC | Age: 43
End: 2023-08-31
Payer: OTHER MISCELLANEOUS

## 2023-08-31 VITALS — HEIGHT: 61 IN | BODY MASS INDEX: 33.04 KG/M2 | WEIGHT: 175 LBS

## 2023-08-31 PROCEDURE — 73110 X-RAY EXAM OF WRIST: CPT | Mod: RT

## 2023-08-31 PROCEDURE — 73610 X-RAY EXAM OF ANKLE: CPT | Mod: LT

## 2023-08-31 PROCEDURE — 99213 OFFICE O/P EST LOW 20 MIN: CPT

## 2023-08-31 NOTE — REASON FOR VISIT
[FreeTextEntry2] : 8/31/23- Apparently hurt right wrist and left ankle in same work injury 4 years ago 6/9/23- Still sharp left knee pain
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
## 2023-08-31 NOTE — PHYSICAL EXAM
[Dorsal Wrist] : dorsal wrist [Right] : right wrist [NL (0)] : extension 0 degrees [5___] : hamstring 5[unfilled]/5 [Equivocal] : equivocal Oliva [AP] : anteroposterior [Lateral] : lateral [Left] : left ankle [There are no fractures, subluxations or dislocations. No significant abnormalities are seen] : There are no fractures, subluxations or dislocations. No significant abnormalities are seen [TWNoteComboBox7] : flexion 130 degrees [] : negative anterior drawer at ankle

## 2023-09-07 ENCOUNTER — APPOINTMENT (OUTPATIENT)
Dept: ORTHOPEDIC SURGERY | Facility: CLINIC | Age: 43
End: 2023-09-07

## 2023-10-16 ENCOUNTER — APPOINTMENT (OUTPATIENT)
Dept: ORTHOPEDIC SURGERY | Facility: CLINIC | Age: 43
End: 2023-10-16

## 2023-10-17 ENCOUNTER — APPOINTMENT (OUTPATIENT)
Dept: ORTHOPEDIC SURGERY | Facility: CLINIC | Age: 43
End: 2023-10-17

## 2023-11-20 ENCOUNTER — APPOINTMENT (OUTPATIENT)
Dept: ORTHOPEDIC SURGERY | Facility: CLINIC | Age: 43
End: 2023-11-20
Payer: OTHER MISCELLANEOUS

## 2023-11-20 VITALS — WEIGHT: 170 LBS | BODY MASS INDEX: 32.1 KG/M2 | HEIGHT: 61 IN

## 2023-11-20 DIAGNOSIS — M25.512 PAIN IN LEFT SHOULDER: ICD-10-CM

## 2023-11-20 DIAGNOSIS — S16.1XXA STRAIN OF MUSCLE, FASCIA AND TENDON AT NECK LEVEL, INITIAL ENCOUNTER: ICD-10-CM

## 2023-11-20 PROCEDURE — 99214 OFFICE O/P EST MOD 30 MIN: CPT

## 2023-11-20 PROCEDURE — 72040 X-RAY EXAM NECK SPINE 2-3 VW: CPT

## 2023-11-20 PROCEDURE — 73030 X-RAY EXAM OF SHOULDER: CPT | Mod: LT

## 2023-12-19 ENCOUNTER — APPOINTMENT (OUTPATIENT)
Dept: ORTHOPEDIC SURGERY | Facility: CLINIC | Age: 43
End: 2023-12-19
Payer: OTHER MISCELLANEOUS

## 2023-12-19 VITALS — BODY MASS INDEX: 32.1 KG/M2 | WEIGHT: 170 LBS | HEIGHT: 61 IN

## 2023-12-19 PROCEDURE — 99213 OFFICE O/P EST LOW 20 MIN: CPT

## 2023-12-19 NOTE — PHYSICAL EXAM
[Dorsal Wrist] : dorsal wrist [Right] : right wrist [NL (0)] : extension 0 degrees [5___] : hamstring 5[unfilled]/5 [Equivocal] : equivocal Oliva [AP] : anteroposterior [Lateral] : lateral [TWNoteComboBox7] : flexion 130 degrees [] : able to perform single heel raise [Left] : left ankle [There are no fractures, subluxations or dislocations. No significant abnormalities are seen] : There are no fractures, subluxations or dislocations. No significant abnormalities are seen

## 2023-12-19 NOTE — REASON FOR VISIT
[FreeTextEntry2] : 12/19/2023 Awaiting more PT 8/31/23- Apparently hurt right wrist and left ankle in same work injury 4 years ago 6/9/23- Still sharp left knee pain
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
## 2024-01-08 ENCOUNTER — APPOINTMENT (OUTPATIENT)
Dept: ORTHOPEDIC SURGERY | Facility: CLINIC | Age: 44
End: 2024-01-08
Payer: OTHER MISCELLANEOUS

## 2024-01-08 PROCEDURE — 99213 OFFICE O/P EST LOW 20 MIN: CPT

## 2024-01-08 NOTE — REASON FOR VISIT
Patient advised and ID confirmed as well as employee health that since PCR test positive she should follow standard guidelines for isolation and will be quarantined from day of test since she is asxs  Her  tested negative and is self isolating/asxs so will verify with his employer rtw date  Employee health and her employer suggested retest since she has been fully vaccinated, asxs and more than 2 weeks post vaccine - she will go for repeat swab Friday and if negative plan RTW 2/15 which would have completed 10 day quarantine  [FreeTextEntry2] : 01/08/2024 Wants to do PT for her wrists 8/31/23- Apparently hurt right wrist and left ankle in same work injury 4 years ago 6/9/23- Still sharp left knee pain
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
## 2024-01-08 NOTE — PHYSICAL EXAM
[Bilateral] : hand bilaterally [Dorsal Wrist] : dorsal wrist [NL (0)] : extension 0 degrees [5___] : hamstring 5[unfilled]/5 [Equivocal] : equivocal Oliva [AP] : anteroposterior [Lateral] : lateral [TWNoteComboBox7] : flexion 130 degrees [] : able to perform single heel raise [Left] : left ankle [There are no fractures, subluxations or dislocations. No significant abnormalities are seen] : There are no fractures, subluxations or dislocations. No significant abnormalities are seen

## 2024-02-05 ENCOUNTER — APPOINTMENT (OUTPATIENT)
Dept: ORTHOPEDIC SURGERY | Facility: CLINIC | Age: 44
End: 2024-02-05
Payer: OTHER MISCELLANEOUS

## 2024-02-05 VITALS — HEIGHT: 61 IN | BODY MASS INDEX: 32.1 KG/M2 | WEIGHT: 170 LBS

## 2024-02-05 DIAGNOSIS — M75.121 COMPLETE ROTATOR CUFF TEAR OR RUPTURE OF RIGHT SHOULDER, NOT SPECIFIED AS TRAUMATIC: ICD-10-CM

## 2024-02-05 DIAGNOSIS — M75.51 BURSITIS OF RIGHT SHOULDER: ICD-10-CM

## 2024-02-05 PROCEDURE — 99214 OFFICE O/P EST MOD 30 MIN: CPT

## 2024-02-05 NOTE — DISCUSSION/SUMMARY
[Surgical risks reviewed] : Surgical risks reviewed [de-identified] : General Dx discussion The patient was advised of the diagnosis. The natural history of the pathology was explained in full to the patient in layman's terms. All questions were answered. The risks and benefits of surgical and non-surgical treatment alternatives were explained in full to the patient.   Case Discussed. MRI's reviewed. Surg/Non surg options discussed. Recommend surgery for her right shoulder. She is having other medical issues at this time  Therefore she will consider surgery when she is medically stable.  In the interim recommend a course of PT for both of her shoulders.     Entered by Anayeli ACUNA acting as a scribe. Instructions: Dr. Short- The documentation recorded by the scribe accurately reflects the service I personally performed and the decisions made by me.

## 2024-02-05 NOTE — HISTORY OF PRESENT ILLNESS
[Work related] : work related [Gradual] : gradual [Result of repetitive motion] : result of repetitive motion [8] : 8 [9] : 9 [Dull/Aching] : dull/aching [Throbbing] : throbbing [Occasional] : occasional [Social interactions] : social interactions [Physical therapy] : physical therapy [Retired] : Work status: retired [de-identified] : Follow up Rt  shoulder pain is the same like to have some RX for PT  [] : Post Surgical Visit: no [FreeTextEntry3] : 4/15/2019 [FreeTextEntry5] : pain is the same  [FreeTextEntry6] : grib problem  [FreeTextEntry7] : hand [de-identified] : ROM [de-identified] : [place] [de-identified] :

## 2024-02-05 NOTE — PHYSICAL EXAM
[Left] : left shoulder [5 ___] : forward flexion 5[unfilled]/5 [5___] : internal rotation 5[unfilled]/5 [] : no erythema [TWNoteComboBox7] : active forward flexion 65 degrees [TWNoteComboBox4] : passive forward flexion 130 degrees [de-identified] : active abduction 50 degrees [TWNoteComboBox9] : passive abduction 130 degrees [TWNoteComboBox6] : internal rotation 45 degrees [de-identified] : external rotation 50 degrees

## 2024-02-05 NOTE — WORK
[Total (100%)] : total (100%) [Does not reveal pre-existing condition(s) that may affect treatment/prognosis] : does not reveal pre-existing condition(s) that may affect treatment/prognosis [N/A] : : Not Applicable [Patient] : patient [No Rx restrictions] : No Rx restrictions. [I provided the services listed above] :  I provided the services listed above. [FreeTextEntry2] : pt has retired

## 2024-02-19 ENCOUNTER — APPOINTMENT (OUTPATIENT)
Dept: ORTHOPEDIC SURGERY | Facility: CLINIC | Age: 44
End: 2024-02-19
Payer: OTHER MISCELLANEOUS

## 2024-02-19 PROCEDURE — 99213 OFFICE O/P EST LOW 20 MIN: CPT
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
## 2024-02-20 NOTE — REASON FOR VISIT
[FreeTextEntry2] : 02/20/2024 Wants to do PT for left ankle as well 01/08/2024 Wants to do PT for her wrists 8/31/23- Apparently hurt right wrist and left ankle in same work injury 4 years ago 6/9/23- Still sharp left knee pain

## 2024-03-26 ENCOUNTER — APPOINTMENT (OUTPATIENT)
Dept: ORTHOPEDIC SURGERY | Facility: CLINIC | Age: 44
End: 2024-03-26
Payer: OTHER MISCELLANEOUS

## 2024-03-26 VITALS — WEIGHT: 170 LBS | BODY MASS INDEX: 32.1 KG/M2 | HEIGHT: 61 IN

## 2024-03-26 PROCEDURE — 99213 OFFICE O/P EST LOW 20 MIN: CPT

## 2024-03-26 NOTE — PHYSICAL EXAM
[Bilateral] : hand bilaterally [Dorsal Wrist] : dorsal wrist [NL (0)] : extension 0 degrees [5___] : quadriceps 5[unfilled]/5 [Equivocal] : equivocal Oliva [AP] : anteroposterior [Lateral] : lateral [TWNoteComboBox7] : flexion 130 degrees [] : able to perform single heel raise [Left] : left ankle [There are no fractures, subluxations or dislocations. No significant abnormalities are seen] : There are no fractures, subluxations or dislocations. No significant abnormalities are seen

## 2024-03-26 NOTE — WORK
[Was the competent medical cause of the injury] : was the competent medical cause of the injury [Sprain/Strain] : sprain/strain [Consistent with my objective findings] : consistent with my objective findings [Are consistent with the injury] : are consistent with the injury [Mild Partial] : mild partial [Does not reveal pre-existing condition(s) that may affect treatment/prognosis] : does not reveal pre-existing condition(s) that may affect treatment/prognosis [N/A] : : Not Applicable [Can return to work without limitations on ______] : can return to work without limitations on [unfilled] [Patient] : patient [No] : No [No Rx restrictions] : No Rx restrictions. [I provided the services listed above] :  I provided the services listed above. [FreeTextEntry1] : fair

## 2024-03-26 NOTE — REASON FOR VISIT
[FreeTextEntry2] : 03/26/2024 Doing PT, would like to continue 02/20/2024 Wants to do PT for left ankle as well 01/08/2024 Wants to do PT for her wrists 8/31/23- Apparently hurt right wrist and left ankle in same work injury 4 years ago 6/9/23- Still sharp left knee pain
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
## 2024-04-08 ENCOUNTER — APPOINTMENT (OUTPATIENT)
Dept: ORTHOPEDIC SURGERY | Facility: CLINIC | Age: 44
End: 2024-04-08
Payer: OTHER MISCELLANEOUS

## 2024-04-08 VITALS — WEIGHT: 170 LBS | BODY MASS INDEX: 32.1 KG/M2 | HEIGHT: 61 IN

## 2024-04-08 DIAGNOSIS — S63.502D UNSPECIFIED SPRAIN OF LEFT WRIST, SUBSEQUENT ENCOUNTER: ICD-10-CM

## 2024-04-08 DIAGNOSIS — S63.501D UNSPECIFIED SPRAIN OF RIGHT WRIST, SUBSEQUENT ENCOUNTER: ICD-10-CM

## 2024-04-08 PROCEDURE — 99213 OFFICE O/P EST LOW 20 MIN: CPT
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
## 2024-04-08 NOTE — PHYSICAL EXAM
[Bilateral] : hand bilaterally [Dorsal Wrist] : dorsal wrist [NL (0)] : extension 0 degrees [5___] : hamstring 5[unfilled]/5 [Equivocal] : equivocal Oliva [AP] : anteroposterior [Lateral] : lateral [There are no fractures, subluxations or dislocations. No significant abnormalities are seen] : There are no fractures, subluxations or dislocations. No significant abnormalities are seen [Left] : left shoulder [TWNoteComboBox7] : flexion 130 degrees [] : negative anterior drawer at ankle

## 2024-04-08 NOTE — REASON FOR VISIT
[FreeTextEntry2] : 04/08/2024 Needs PT for left shoulder, left knee and OT for both wrists 03/26/2024 Doing PT, would like to continue 02/20/2024 Wants to do PT for left ankle as well 01/08/2024 Wants to do PT for her wrists 8/31/23- Apparently hurt right wrist and left ankle in same work injury 4 years ago 6/9/23- Still sharp left knee pain

## 2024-04-15 ENCOUNTER — APPOINTMENT (OUTPATIENT)
Dept: CARDIOLOGY | Facility: CLINIC | Age: 44
End: 2024-04-15

## 2024-05-06 ENCOUNTER — APPOINTMENT (OUTPATIENT)
Dept: ORTHOPEDIC SURGERY | Facility: CLINIC | Age: 44
End: 2024-05-06
Payer: OTHER MISCELLANEOUS

## 2024-05-20 ENCOUNTER — APPOINTMENT (OUTPATIENT)
Dept: CARDIOLOGY | Facility: CLINIC | Age: 44
End: 2024-05-20

## 2024-05-29 ENCOUNTER — APPOINTMENT (OUTPATIENT)
Dept: CARDIOLOGY | Facility: CLINIC | Age: 44
End: 2024-05-29

## 2024-06-10 ENCOUNTER — APPOINTMENT (OUTPATIENT)
Dept: ORTHOPEDIC SURGERY | Facility: CLINIC | Age: 44
End: 2024-06-10
Payer: OTHER MISCELLANEOUS

## 2024-06-10 VITALS — BODY MASS INDEX: 32.1 KG/M2 | WEIGHT: 170 LBS | HEIGHT: 61 IN

## 2024-06-10 DIAGNOSIS — M75.112 INCOMPLETE ROTATOR CUFF TEAR OR RUPTURE OF LEFT SHOULDER, NOT SPECIFIED AS TRAUMATIC: ICD-10-CM

## 2024-06-10 DIAGNOSIS — M75.122 COMPLETE ROTATOR CUFF TEAR OR RUPTURE OF LEFT SHOULDER, NOT SPECIFIED AS TRAUMATIC: ICD-10-CM

## 2024-06-10 PROCEDURE — 99214 OFFICE O/P EST MOD 30 MIN: CPT

## 2024-06-10 NOTE — REASON FOR VISIT
[FreeTextEntry2] : Follow up - Right Shoulder pain going to PT  having a neurologic issue which she is getting care for

## 2024-06-10 NOTE — DISCUSSION/SUMMARY
[Surgical risks reviewed] : Surgical risks reviewed [de-identified] : General Dx discussion The patient was advised of the diagnosis. The natural history of the pathology was explained in full to the patient in layman's terms. All questions were answered. The risks and benefits of surgical and non-surgical treatment alternatives were explained in full to the patient.   Case Discussed. Recommend surgery for her right shoulder. She is having other medical issues at this time  Therefore she will consider surgery when she is medically stable.  In the interim recommend a course of PT for both of her shoulders.

## 2024-06-10 NOTE — HISTORY OF PRESENT ILLNESS
[Right Arm] : right arm [10] : 10 [8] : 8 [Dull/Aching] : dull/aching [Sharp] : sharp [Frequent] : frequent [Leisure] : leisure [Sleep] : sleep [Rest] : rest [Meds] : meds [Ice] : ice [Heat] : heat [Physical therapy] : physical therapy [Exercising] : exercising [Work related] : work related [Retired] : Work status: retired [] : Post Surgical Visit: no [FreeTextEntry1] : Right Shoulder [FreeTextEntry9] : ibuprofen [de-identified] : lifting [de-identified] : 02/05/24 [de-identified] : Dr. Short [de-identified] : 06/10/24

## 2024-06-10 NOTE — PHYSICAL EXAM
[Right] : right shoulder [5 ___] : forward flexion 5[unfilled]/5 [5___] : internal rotation 5[unfilled]/5 [Left] : left shoulder [Standing] : standing [Mild] : mild [] : no erythema [TWNoteComboBox4] : passive forward flexion 130 degrees [TWNoteComboBox7] : active forward flexion 85 degrees [de-identified] : active abduction 65 degrees [TWNoteComboBox9] : passive abduction 130 degrees [TWNoteComboBox6] : False [de-identified] : False

## 2024-06-11 ENCOUNTER — APPOINTMENT (OUTPATIENT)
Dept: ORTHOPEDIC SURGERY | Facility: CLINIC | Age: 44
End: 2024-06-11
Payer: OTHER MISCELLANEOUS

## 2024-06-11 DIAGNOSIS — S93.492D SPRAIN OF OTHER LIGAMENT OF LEFT ANKLE, SUBSEQUENT ENCOUNTER: ICD-10-CM

## 2024-06-11 PROCEDURE — 99213 OFFICE O/P EST LOW 20 MIN: CPT

## 2024-06-11 NOTE — REASON FOR VISIT
[FreeTextEntry2] : 06/11/2024 Doing PT, would like to continue 04/08/2024 Needs PT for left shoulder, left knee and OT for both wrists 03/26/2024 Doing PT, would like to continue 02/20/2024 Wants to do PT for left ankle as well 01/08/2024 Wants to do PT for her wrists 8/31/23- Apparently hurt right wrist and left ankle in same work injury 4 years ago 6/9/23- Still sharp left knee pain

## 2024-06-11 NOTE — PHYSICAL EXAM
show [Bilateral] : hand bilaterally [Dorsal Wrist] : dorsal wrist [NL (0)] : extension 0 degrees [5___] : hamstring 5[unfilled]/5 [Equivocal] : equivocal Oliva [AP] : anteroposterior [Lateral] : lateral [TWNoteComboBox7] : flexion 130 degrees [] : able to perform single heel raise [Left] : left ankle [There are no fractures, subluxations or dislocations. No significant abnormalities are seen] : There are no fractures, subluxations or dislocations. No significant abnormalities are seen
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
## 2024-06-21 ENCOUNTER — APPOINTMENT (OUTPATIENT)
Dept: ORTHOPEDIC SURGERY | Facility: CLINIC | Age: 44
End: 2024-06-21

## 2024-06-25 ENCOUNTER — APPOINTMENT (OUTPATIENT)
Dept: ORTHOPEDIC SURGERY | Facility: CLINIC | Age: 44
End: 2024-06-25
Payer: OTHER MISCELLANEOUS

## 2024-06-25 VITALS — WEIGHT: 170 LBS | BODY MASS INDEX: 32.1 KG/M2 | HEIGHT: 61 IN

## 2024-06-25 DIAGNOSIS — M17.12 UNILATERAL PRIMARY OSTEOARTHRITIS, LEFT KNEE: ICD-10-CM

## 2024-06-25 DIAGNOSIS — M22.42 CHONDROMALACIA PATELLAE, LEFT KNEE: ICD-10-CM

## 2024-06-25 DIAGNOSIS — M23.92 UNSPECIFIED INTERNAL DERANGEMENT OF LEFT KNEE: ICD-10-CM

## 2024-06-25 PROCEDURE — 99213 OFFICE O/P EST LOW 20 MIN: CPT

## 2024-07-08 ENCOUNTER — APPOINTMENT (OUTPATIENT)
Dept: ORTHOPEDIC SURGERY | Facility: CLINIC | Age: 44
End: 2024-07-08
Payer: OTHER MISCELLANEOUS

## 2024-07-15 ENCOUNTER — APPOINTMENT (OUTPATIENT)
Dept: ORTHOPEDIC SURGERY | Facility: CLINIC | Age: 44
End: 2024-07-15
Payer: OTHER MISCELLANEOUS

## 2024-07-15 ENCOUNTER — APPOINTMENT (OUTPATIENT)
Dept: CARDIOLOGY | Facility: CLINIC | Age: 44
End: 2024-07-15

## 2024-07-15 VITALS
BODY MASS INDEX: 30.21 KG/M2 | HEART RATE: 92 BPM | WEIGHT: 160 LBS | SYSTOLIC BLOOD PRESSURE: 122 MMHG | OXYGEN SATURATION: 97 % | DIASTOLIC BLOOD PRESSURE: 87 MMHG | HEIGHT: 61 IN

## 2024-07-15 VITALS — BODY MASS INDEX: 30.21 KG/M2 | HEIGHT: 61 IN | WEIGHT: 160 LBS

## 2024-07-15 DIAGNOSIS — S63.502D UNSPECIFIED SPRAIN OF LEFT WRIST, SUBSEQUENT ENCOUNTER: ICD-10-CM

## 2024-07-15 DIAGNOSIS — S63.501D UNSPECIFIED SPRAIN OF RIGHT WRIST, SUBSEQUENT ENCOUNTER: ICD-10-CM

## 2024-07-15 PROCEDURE — G2211 COMPLEX E/M VISIT ADD ON: CPT | Mod: NC

## 2024-07-15 PROCEDURE — 99213 OFFICE O/P EST LOW 20 MIN: CPT

## 2024-07-15 PROCEDURE — 99214 OFFICE O/P EST MOD 30 MIN: CPT | Mod: 25

## 2024-07-15 PROCEDURE — 93000 ELECTROCARDIOGRAM COMPLETE: CPT

## 2024-07-30 ENCOUNTER — APPOINTMENT (OUTPATIENT)
Dept: ORTHOPEDIC SURGERY | Facility: CLINIC | Age: 44
End: 2024-07-30
Payer: OTHER MISCELLANEOUS

## 2024-07-30 DIAGNOSIS — M22.42 CHONDROMALACIA PATELLAE, LEFT KNEE: ICD-10-CM

## 2024-07-30 DIAGNOSIS — M23.92 UNSPECIFIED INTERNAL DERANGEMENT OF LEFT KNEE: ICD-10-CM

## 2024-07-30 PROCEDURE — 99213 OFFICE O/P EST LOW 20 MIN: CPT
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
## 2024-07-30 NOTE — PHYSICAL EXAM
[5___] : hamstring 5[unfilled]/5 [Equivocal] : equivocal Oliva [Left] : left knee [AP] : anteroposterior [Lateral] : lateral [There are no fractures, subluxations or dislocations. No significant abnormalities are seen] : There are no fractures, subluxations or dislocations. No significant abnormalities are seen [] : negative Lachmann [FreeTextEntry9] : + tight hamstrings bilaterally, stiffness with motion [TWNoteComboBox7] : flexion 120 degrees [de-identified] : extension 5 degrees

## 2024-07-30 NOTE — ASSESSMENT
[FreeTextEntry1] : continue PT/OT to improve pain and function, stretching, strengthening she was told she cannot have injections with her hx of seizure. Return 6-8 weeks

## 2024-08-08 ENCOUNTER — APPOINTMENT (OUTPATIENT)
Dept: CARDIOLOGY | Facility: CLINIC | Age: 44
End: 2024-08-08

## 2024-08-08 PROCEDURE — 93306 TTE W/DOPPLER COMPLETE: CPT

## 2024-09-09 ENCOUNTER — APPOINTMENT (OUTPATIENT)
Dept: ORTHOPEDIC SURGERY | Facility: CLINIC | Age: 44
End: 2024-09-09
Payer: OTHER MISCELLANEOUS

## 2024-09-10 ENCOUNTER — APPOINTMENT (OUTPATIENT)
Dept: ORTHOPEDIC SURGERY | Facility: CLINIC | Age: 44
End: 2024-09-10
Payer: OTHER MISCELLANEOUS

## 2024-09-10 DIAGNOSIS — M17.12 UNILATERAL PRIMARY OSTEOARTHRITIS, LEFT KNEE: ICD-10-CM

## 2024-09-10 DIAGNOSIS — M25.512 PAIN IN LEFT SHOULDER: ICD-10-CM

## 2024-09-10 DIAGNOSIS — M22.42 CHONDROMALACIA PATELLAE, LEFT KNEE: ICD-10-CM

## 2024-09-10 DIAGNOSIS — M75.112 INCOMPLETE ROTATOR CUFF TEAR OR RUPTURE OF LEFT SHOULDER, NOT SPECIFIED AS TRAUMATIC: ICD-10-CM

## 2024-09-10 PROCEDURE — 99213 OFFICE O/P EST LOW 20 MIN: CPT

## 2024-09-10 NOTE — PHYSICAL EXAM
[5___] : hamstring 5[unfilled]/5 [Equivocal] : equivocal Oliva [Left] : left knee [] : able to do straight leg raise [AP] : anteroposterior [Lateral] : lateral [There are no fractures, subluxations or dislocations. No significant abnormalities are seen] : There are no fractures, subluxations or dislocations. No significant abnormalities are seen [FreeTextEntry9] : + tight hamstrings bilaterally, stiffness with motion [TWNoteComboBox7] : flexion 120 degrees [de-identified] : extension 5 degrees
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
## 2024-09-16 ENCOUNTER — APPOINTMENT (OUTPATIENT)
Dept: ORTHOPEDIC SURGERY | Facility: CLINIC | Age: 44
End: 2024-09-16
Payer: OTHER MISCELLANEOUS

## 2024-09-16 VITALS — HEIGHT: 61 IN | WEIGHT: 160 LBS | BODY MASS INDEX: 30.21 KG/M2

## 2024-09-16 DIAGNOSIS — M75.121 COMPLETE ROTATOR CUFF TEAR OR RUPTURE OF RIGHT SHOULDER, NOT SPECIFIED AS TRAUMATIC: ICD-10-CM

## 2024-09-16 DIAGNOSIS — M75.51 BURSITIS OF RIGHT SHOULDER: ICD-10-CM

## 2024-09-16 PROCEDURE — 99214 OFFICE O/P EST MOD 30 MIN: CPT

## 2024-09-16 NOTE — DISCUSSION/SUMMARY
[Surgical risks reviewed] : Surgical risks reviewed [de-identified] : General Dx discussion The patient was advised of the diagnosis. The natural history of the pathology was explained in full to the patient in layman's terms. All questions were answered. The risks and benefits of surgical and non-surgical treatment alternatives were explained in full to the patient.   Case Discussed. Recommend surgery for her right shoulder. She is having other medical issues at this time  She will get a updated mri to eval for progression of her tear  f/u after mri.     Entered by Anayeli ACUNA acting as a scribe. Instructions: Dr. Short- The documentation recorded by the scribe accurately reflects the service I personally performed and the decisions made by me.

## 2024-09-16 NOTE — HISTORY OF PRESENT ILLNESS
[10] : 10 [5] : 5 [Dull/Aching] : dull/aching [Sharp] : sharp [] : yes [Constant] : constant [Household chores] : household chores [Leisure] : leisure [Retired] : Work status: retired [de-identified] : WC 4/15/19 Here for f/u right shoulder. She continues to have pain. PT. Having other medial conditions so she is not cleared for surgery at this time.  [FreeTextEntry1] : right shoulder [FreeTextEntry7] : to her wrist and neck [de-identified] : opening a jar, lifting [de-identified] : HEP

## 2024-09-16 NOTE — PHYSICAL EXAM
[Right] : right shoulder [5 ___] : forward flexion 5[unfilled]/5 [5___] : internal rotation 5[unfilled]/5 [Left] : left shoulder [Standing] : standing [Mild] : mild [] : no erythema [TWNoteComboBox7] : active forward flexion 85 degrees [TWNoteComboBox4] : passive forward flexion 130 degrees [de-identified] : active abduction 65 degrees [TWNoteComboBox9] : passive abduction 130 degrees

## 2024-09-17 ENCOUNTER — APPOINTMENT (OUTPATIENT)
Dept: MRI IMAGING | Facility: CLINIC | Age: 44
End: 2024-09-17

## 2024-09-20 ENCOUNTER — APPOINTMENT (OUTPATIENT)
Dept: ORTHOPEDIC SURGERY | Facility: CLINIC | Age: 44
End: 2024-09-20

## 2024-10-07 ENCOUNTER — APPOINTMENT (OUTPATIENT)
Dept: ORTHOPEDIC SURGERY | Facility: CLINIC | Age: 44
End: 2024-10-07

## 2024-10-09 ENCOUNTER — APPOINTMENT (OUTPATIENT)
Dept: MRI IMAGING | Facility: CLINIC | Age: 44
End: 2024-10-09

## 2024-10-21 ENCOUNTER — APPOINTMENT (OUTPATIENT)
Dept: ORTHOPEDIC SURGERY | Facility: CLINIC | Age: 44
End: 2024-10-21

## 2024-10-25 ENCOUNTER — APPOINTMENT (OUTPATIENT)
Dept: ORTHOPEDIC SURGERY | Facility: CLINIC | Age: 44
End: 2024-10-25

## 2024-10-28 ENCOUNTER — APPOINTMENT (OUTPATIENT)
Dept: ORTHOPEDIC SURGERY | Facility: CLINIC | Age: 44
End: 2024-10-28
Payer: OTHER MISCELLANEOUS

## 2024-10-28 ENCOUNTER — APPOINTMENT (OUTPATIENT)
Dept: ORTHOPEDIC SURGERY | Facility: CLINIC | Age: 44
End: 2024-10-28

## 2024-10-28 DIAGNOSIS — S63.501D UNSPECIFIED SPRAIN OF RIGHT WRIST, SUBSEQUENT ENCOUNTER: ICD-10-CM

## 2024-10-28 DIAGNOSIS — S63.502D UNSPECIFIED SPRAIN OF LEFT WRIST, SUBSEQUENT ENCOUNTER: ICD-10-CM

## 2024-10-28 PROCEDURE — 99213 OFFICE O/P EST LOW 20 MIN: CPT

## 2024-11-04 ENCOUNTER — APPOINTMENT (OUTPATIENT)
Dept: CARDIOLOGY | Facility: CLINIC | Age: 44
End: 2024-11-04

## 2024-11-18 ENCOUNTER — APPOINTMENT (OUTPATIENT)
Dept: ORTHOPEDIC SURGERY | Facility: CLINIC | Age: 44
End: 2024-11-18

## 2024-11-22 ENCOUNTER — APPOINTMENT (OUTPATIENT)
Dept: ORTHOPEDIC SURGERY | Facility: CLINIC | Age: 44
End: 2024-11-22

## 2024-12-02 ENCOUNTER — NON-APPOINTMENT (OUTPATIENT)
Age: 44
End: 2024-12-02

## 2024-12-03 ENCOUNTER — APPOINTMENT (OUTPATIENT)
Dept: ORTHOPEDIC SURGERY | Facility: CLINIC | Age: 44
End: 2024-12-03
Payer: OTHER MISCELLANEOUS

## 2024-12-03 DIAGNOSIS — M23.92 UNSPECIFIED INTERNAL DERANGEMENT OF LEFT KNEE: ICD-10-CM

## 2024-12-03 DIAGNOSIS — M75.112 INCOMPLETE ROTATOR CUFF TEAR OR RUPTURE OF LEFT SHOULDER, NOT SPECIFIED AS TRAUMATIC: ICD-10-CM

## 2024-12-03 DIAGNOSIS — M17.12 UNILATERAL PRIMARY OSTEOARTHRITIS, LEFT KNEE: ICD-10-CM

## 2024-12-03 DIAGNOSIS — M22.42 CHONDROMALACIA PATELLAE, LEFT KNEE: ICD-10-CM

## 2024-12-03 PROCEDURE — 99213 OFFICE O/P EST LOW 20 MIN: CPT

## 2024-12-16 ENCOUNTER — APPOINTMENT (OUTPATIENT)
Dept: ORTHOPEDIC SURGERY | Facility: CLINIC | Age: 44
End: 2024-12-16
Payer: OTHER MISCELLANEOUS

## 2024-12-16 DIAGNOSIS — M75.122 COMPLETE ROTATOR CUFF TEAR OR RUPTURE OF LEFT SHOULDER, NOT SPECIFIED AS TRAUMATIC: ICD-10-CM

## 2024-12-16 DIAGNOSIS — M75.121 COMPLETE ROTATOR CUFF TEAR OR RUPTURE OF RIGHT SHOULDER, NOT SPECIFIED AS TRAUMATIC: ICD-10-CM

## 2024-12-16 PROCEDURE — 99214 OFFICE O/P EST MOD 30 MIN: CPT

## 2024-12-23 ENCOUNTER — APPOINTMENT (OUTPATIENT)
Dept: CARDIOLOGY | Facility: CLINIC | Age: 44
End: 2024-12-23
Payer: COMMERCIAL

## 2024-12-23 ENCOUNTER — NON-APPOINTMENT (OUTPATIENT)
Age: 44
End: 2024-12-23

## 2024-12-23 VITALS
HEART RATE: 94 BPM | WEIGHT: 160 LBS | DIASTOLIC BLOOD PRESSURE: 78 MMHG | HEIGHT: 61 IN | BODY MASS INDEX: 30.21 KG/M2 | OXYGEN SATURATION: 97 % | SYSTOLIC BLOOD PRESSURE: 113 MMHG

## 2024-12-23 DIAGNOSIS — Q21.12 PATENT FORAMEN OVALE: ICD-10-CM

## 2024-12-23 PROCEDURE — 99214 OFFICE O/P EST MOD 30 MIN: CPT

## 2024-12-23 PROCEDURE — G2211 COMPLEX E/M VISIT ADD ON: CPT | Mod: NC

## 2024-12-23 PROCEDURE — 93000 ELECTROCARDIOGRAM COMPLETE: CPT

## 2025-01-13 ENCOUNTER — APPOINTMENT (OUTPATIENT)
Dept: ORTHOPEDIC SURGERY | Facility: CLINIC | Age: 45
End: 2025-01-13

## 2025-01-20 ENCOUNTER — APPOINTMENT (OUTPATIENT)
Dept: ORTHOPEDIC SURGERY | Facility: CLINIC | Age: 45
End: 2025-01-20
Payer: OTHER MISCELLANEOUS

## 2025-01-20 VITALS — HEIGHT: 61 IN | WEIGHT: 165 LBS | BODY MASS INDEX: 31.15 KG/M2

## 2025-01-20 DIAGNOSIS — M75.122 COMPLETE ROTATOR CUFF TEAR OR RUPTURE OF LEFT SHOULDER, NOT SPECIFIED AS TRAUMATIC: ICD-10-CM

## 2025-01-20 DIAGNOSIS — M25.512 PAIN IN LEFT SHOULDER: ICD-10-CM

## 2025-01-20 PROCEDURE — 99213 OFFICE O/P EST LOW 20 MIN: CPT

## 2025-01-24 ENCOUNTER — APPOINTMENT (OUTPATIENT)
Dept: ORTHOPEDIC SURGERY | Facility: CLINIC | Age: 45
End: 2025-01-24

## 2025-01-27 ENCOUNTER — APPOINTMENT (OUTPATIENT)
Dept: ORTHOPEDIC SURGERY | Facility: CLINIC | Age: 45
End: 2025-01-27
Payer: OTHER MISCELLANEOUS

## 2025-01-27 ENCOUNTER — APPOINTMENT (OUTPATIENT)
Dept: ORTHOPEDIC SURGERY | Facility: CLINIC | Age: 45
End: 2025-01-27

## 2025-01-27 DIAGNOSIS — S63.501D UNSPECIFIED SPRAIN OF RIGHT WRIST, SUBSEQUENT ENCOUNTER: ICD-10-CM

## 2025-01-27 DIAGNOSIS — S63.502D UNSPECIFIED SPRAIN OF LEFT WRIST, SUBSEQUENT ENCOUNTER: ICD-10-CM

## 2025-01-27 PROCEDURE — 99213 OFFICE O/P EST LOW 20 MIN: CPT

## 2025-02-03 ENCOUNTER — APPOINTMENT (OUTPATIENT)
Dept: ORTHOPEDIC SURGERY | Facility: CLINIC | Age: 45
End: 2025-02-03

## 2025-03-06 ENCOUNTER — APPOINTMENT (OUTPATIENT)
Dept: ORTHOPEDIC SURGERY | Facility: CLINIC | Age: 45
End: 2025-03-06
Payer: OTHER MISCELLANEOUS

## 2025-03-06 DIAGNOSIS — M17.12 UNILATERAL PRIMARY OSTEOARTHRITIS, LEFT KNEE: ICD-10-CM

## 2025-03-06 DIAGNOSIS — M75.112 INCOMPLETE ROTATOR CUFF TEAR OR RUPTURE OF LEFT SHOULDER, NOT SPECIFIED AS TRAUMATIC: ICD-10-CM

## 2025-03-06 PROCEDURE — 99213 OFFICE O/P EST LOW 20 MIN: CPT

## 2025-04-14 ENCOUNTER — APPOINTMENT (OUTPATIENT)
Dept: ORTHOPEDIC SURGERY | Facility: CLINIC | Age: 45
End: 2025-04-14
Payer: OTHER MISCELLANEOUS

## 2025-04-18 ENCOUNTER — APPOINTMENT (OUTPATIENT)
Dept: ORTHOPEDIC SURGERY | Facility: CLINIC | Age: 45
End: 2025-04-18
Payer: OTHER MISCELLANEOUS

## 2025-04-18 DIAGNOSIS — M75.112 INCOMPLETE ROTATOR CUFF TEAR OR RUPTURE OF LEFT SHOULDER, NOT SPECIFIED AS TRAUMATIC: ICD-10-CM

## 2025-04-18 DIAGNOSIS — M25.512 PAIN IN LEFT SHOULDER: ICD-10-CM

## 2025-04-18 PROCEDURE — 99213 OFFICE O/P EST LOW 20 MIN: CPT

## 2025-04-22 ENCOUNTER — APPOINTMENT (OUTPATIENT)
Dept: ORTHOPEDIC SURGERY | Facility: CLINIC | Age: 45
End: 2025-04-22

## 2025-04-24 ENCOUNTER — APPOINTMENT (OUTPATIENT)
Dept: ORTHOPEDIC SURGERY | Facility: CLINIC | Age: 45
End: 2025-04-24
Payer: OTHER MISCELLANEOUS

## 2025-04-24 DIAGNOSIS — M75.121 COMPLETE ROTATOR CUFF TEAR OR RUPTURE OF RIGHT SHOULDER, NOT SPECIFIED AS TRAUMATIC: ICD-10-CM

## 2025-04-24 PROCEDURE — 99213 OFFICE O/P EST LOW 20 MIN: CPT

## 2025-05-12 ENCOUNTER — APPOINTMENT (OUTPATIENT)
Dept: CARDIOLOGY | Facility: CLINIC | Age: 45
End: 2025-05-12

## 2025-05-12 ENCOUNTER — NON-APPOINTMENT (OUTPATIENT)
Age: 45
End: 2025-05-12

## 2025-05-12 VITALS
HEART RATE: 93 BPM | WEIGHT: 165 LBS | DIASTOLIC BLOOD PRESSURE: 88 MMHG | HEIGHT: 61 IN | OXYGEN SATURATION: 100 % | BODY MASS INDEX: 31.15 KG/M2 | SYSTOLIC BLOOD PRESSURE: 125 MMHG

## 2025-05-12 PROCEDURE — 93000 ELECTROCARDIOGRAM COMPLETE: CPT

## 2025-05-12 PROCEDURE — G2211 COMPLEX E/M VISIT ADD ON: CPT | Mod: NC

## 2025-05-12 PROCEDURE — 99214 OFFICE O/P EST MOD 30 MIN: CPT

## 2025-05-16 ENCOUNTER — APPOINTMENT (OUTPATIENT)
Dept: ORTHOPEDIC SURGERY | Facility: CLINIC | Age: 45
End: 2025-05-16
Payer: OTHER MISCELLANEOUS

## 2025-05-16 VITALS — WEIGHT: 165 LBS | BODY MASS INDEX: 31.15 KG/M2 | HEIGHT: 61 IN

## 2025-05-16 DIAGNOSIS — S63.501D UNSPECIFIED SPRAIN OF RIGHT WRIST, SUBSEQUENT ENCOUNTER: ICD-10-CM

## 2025-05-16 DIAGNOSIS — S63.502D UNSPECIFIED SPRAIN OF LEFT WRIST, SUBSEQUENT ENCOUNTER: ICD-10-CM

## 2025-05-16 PROCEDURE — 99213 OFFICE O/P EST LOW 20 MIN: CPT

## 2025-05-21 ENCOUNTER — APPOINTMENT (OUTPATIENT)
Dept: CARDIOLOGY | Facility: CLINIC | Age: 45
End: 2025-05-21

## 2025-05-28 ENCOUNTER — APPOINTMENT (OUTPATIENT)
Dept: CARDIOLOGY | Facility: CLINIC | Age: 45
End: 2025-05-28

## 2025-06-05 ENCOUNTER — APPOINTMENT (OUTPATIENT)
Dept: ORTHOPEDIC SURGERY | Facility: CLINIC | Age: 45
End: 2025-06-05
Payer: OTHER MISCELLANEOUS

## 2025-06-05 DIAGNOSIS — M75.112 INCOMPLETE ROTATOR CUFF TEAR OR RUPTURE OF LEFT SHOULDER, NOT SPECIFIED AS TRAUMATIC: ICD-10-CM

## 2025-06-05 DIAGNOSIS — M17.12 UNILATERAL PRIMARY OSTEOARTHRITIS, LEFT KNEE: ICD-10-CM

## 2025-06-05 DIAGNOSIS — M25.512 PAIN IN LEFT SHOULDER: ICD-10-CM

## 2025-06-05 PROCEDURE — 99213 OFFICE O/P EST LOW 20 MIN: CPT

## 2025-07-21 ENCOUNTER — APPOINTMENT (OUTPATIENT)
Dept: ORTHOPEDIC SURGERY | Facility: CLINIC | Age: 45
End: 2025-07-21

## 2025-07-24 ENCOUNTER — APPOINTMENT (OUTPATIENT)
Dept: ORTHOPEDIC SURGERY | Facility: CLINIC | Age: 45
End: 2025-07-24
Payer: OTHER MISCELLANEOUS

## 2025-07-24 DIAGNOSIS — M75.121 COMPLETE ROTATOR CUFF TEAR OR RUPTURE OF RIGHT SHOULDER, NOT SPECIFIED AS TRAUMATIC: ICD-10-CM

## 2025-07-24 DIAGNOSIS — M75.51 BURSITIS OF RIGHT SHOULDER: ICD-10-CM

## 2025-07-24 PROCEDURE — 99213 OFFICE O/P EST LOW 20 MIN: CPT

## 2025-08-04 ENCOUNTER — APPOINTMENT (OUTPATIENT)
Dept: ORTHOPEDIC SURGERY | Facility: CLINIC | Age: 45
End: 2025-08-04
Payer: OTHER MISCELLANEOUS

## 2025-08-04 DIAGNOSIS — S63.502D UNSPECIFIED SPRAIN OF LEFT WRIST, SUBSEQUENT ENCOUNTER: ICD-10-CM

## 2025-08-04 DIAGNOSIS — S63.501D UNSPECIFIED SPRAIN OF RIGHT WRIST, SUBSEQUENT ENCOUNTER: ICD-10-CM

## 2025-08-04 PROCEDURE — 99213 OFFICE O/P EST LOW 20 MIN: CPT

## 2025-08-15 ENCOUNTER — APPOINTMENT (OUTPATIENT)
Dept: ORTHOPEDIC SURGERY | Facility: CLINIC | Age: 45
End: 2025-08-15

## 2025-09-11 ENCOUNTER — APPOINTMENT (OUTPATIENT)
Dept: ORTHOPEDIC SURGERY | Facility: CLINIC | Age: 45
End: 2025-09-11

## 2025-09-15 ENCOUNTER — APPOINTMENT (OUTPATIENT)
Dept: ORTHOPEDIC SURGERY | Facility: CLINIC | Age: 45
End: 2025-09-15